# Patient Record
Sex: MALE | Race: AMERICAN INDIAN OR ALASKA NATIVE
[De-identification: names, ages, dates, MRNs, and addresses within clinical notes are randomized per-mention and may not be internally consistent; named-entity substitution may affect disease eponyms.]

---

## 2017-09-06 NOTE — CAT SCAN REPORT
FINAL REPORT



PROCEDURE:  CT HEAD WO CONTRAST



TECHNIQUE:  Computerized tomography of the head was performed

without contrast material. 



HISTORY:  ASSAULT 



COMPARISON:  No prior studies are available for comparison.



FINDINGS:  

Skull and scalp: There is mild frontal scalp swelling. There is

no hematoma. The bony calvarium is intact. There is no skull

fracture..



Paranasal sinuses: Normal.



Ventricles and subarachnoid spaces: Normal.



Cerebrum: No evidence of hemorrhage, acute infarction or mass .



Cerebellum and brainstem: No evidence of hemorrhage, acute

infarction or mass.



Vasculature: Normal.



Comments: None.



IMPRESSION:  

There is frontal scalp soft tissue swelling. There is no

hematoma. There is no skull fracture. There is no intracranial

hemorrhage.

## 2017-09-06 NOTE — CAT SCAN REPORT
FINAL REPORT



PROCEDURE:  CT FACIAL BONES WO CONTRAST



TECHNIQUE:  Computerized tomography of the facial bones and soft

tissues with axial and coronal sections performed from the

cranial aspect of the frontal sinuses to the caudal portion of

the mandible without contrast material. 



HISTORY:  ASSAULT 



COMPARISON:  No prior studies are available for comparison.



FINDINGS:  

Bones: No significant abnormality.



Paranasal sinuses: Clear.



Soft tissues: There is left facial and periorbital soft tissue

swelling. There is frontal scalp swelling. There is no hematoma..



Other: None.



IMPRESSION:  

There is soft tissue swelling as described. There is no hematoma.

There is no bony injury. The paranasal sinuses are clear.

## 2021-01-17 ENCOUNTER — HOSPITAL ENCOUNTER (EMERGENCY)
Dept: HOSPITAL 5 - ED | Age: 31
End: 2021-01-17
Payer: SELF-PAY

## 2021-01-17 DIAGNOSIS — R53.1: Primary | ICD-10-CM

## 2021-01-17 DIAGNOSIS — Z53.21: ICD-10-CM

## 2021-01-17 PROCEDURE — 36415 COLL VENOUS BLD VENIPUNCTURE: CPT

## 2021-01-17 PROCEDURE — 71046 X-RAY EXAM CHEST 2 VIEWS: CPT

## 2021-01-17 PROCEDURE — 71045 X-RAY EXAM CHEST 1 VIEW: CPT

## 2021-01-17 PROCEDURE — 85007 BL SMEAR W/DIFF WBC COUNT: CPT

## 2021-01-17 NOTE — EMERGENCY DEPARTMENT REPORT
Blank Doc





- Documentation


Documentation: 





30-year-old male that presents with body aches and n/v.





This initial assessment/diagnostic orders/clinical plan/treatment(s) is/are 

subject to change based on patient's health status, clinical progression and re-

assessment by fellow clinical providers in the ED.  Further treatment and workup

at subsequent clinical providers discretion.  Patient/guardians urged not to 

elope from the ED as their condition may be serious if not clinically assessed 

and managed.  Initial orders include:


1- Patient sent to [facility] for further evaluation and treatment


2- labs


3- UA


4- CXR

## 2021-01-18 VITALS — SYSTOLIC BLOOD PRESSURE: 152 MMHG | DIASTOLIC BLOOD PRESSURE: 105 MMHG

## 2021-01-18 LAB
%HYPO/RBC NFR BLD AUTO: (no result) %
ALBUMIN SERPL-MCNC: 5.7 G/DL (ref 3.9–5)
ALT SERPL-CCNC: 30 UNITS/L (ref 7–56)
BAND NEUTROPHILS # (MANUAL): 0 K/MM3
BUN SERPL-MCNC: 20 MG/DL (ref 9–20)
BUN/CREAT SERPL: 8 %
CALCIUM SERPL-MCNC: 10.9 MG/DL (ref 8.4–10.2)
HCT VFR BLD CALC: 48.4 % (ref 35.5–45.6)
HEMOLYSIS INDEX: 7
HGB BLD-MCNC: 15.9 GM/DL (ref 11.8–15.2)
MCHC RBC AUTO-ENTMCNC: 33 % (ref 32–34)
MCV RBC AUTO: 70 FL (ref 84–94)
MYELOCYTES # (MANUAL): 0 K/MM3
PLATELET # BLD: 361 K/MM3 (ref 140–440)
PROMYELOCYTES # (MANUAL): 0 K/MM3
RBC # BLD AUTO: 6.95 M/MM3 (ref 3.65–5.03)
TOTAL CELLS COUNTED BLD: 100

## 2021-01-18 NOTE — XRAY REPORT
CHEST 1 VIEW [id number]



INDICATION / CLINICAL INFORMATION: cough



COMPARISON: 9/22/2020



FINDINGS:



SUPPORT DEVICES: None



HEART / MEDIASTINUM: No significant abnormality. 



LUNGS / PLEURA: No significant pulmonary or pleural abnormality. No pneumothorax. 



ADDITIONAL FINDINGS: No significant additional findings.



IMPRESSION: No significant acute abnormality



Signer Name: Jefferson Peterson MD 

Signed: 1/18/2021 12:43 AM

Workstation Name: [email or web address]